# Patient Record
Sex: FEMALE | Race: WHITE | Employment: FULL TIME | URBAN - NONMETROPOLITAN AREA
[De-identification: names, ages, dates, MRNs, and addresses within clinical notes are randomized per-mention and may not be internally consistent; named-entity substitution may affect disease eponyms.]

---

## 2022-02-15 ENCOUNTER — HOSPITAL ENCOUNTER (OUTPATIENT)
Age: 57
Setting detail: OUTPATIENT SURGERY
Discharge: HOME | End: 2022-02-15
Attending: INTERNAL MEDICINE | Admitting: INTERNAL MEDICINE
Payer: COMMERCIAL

## 2022-02-15 VITALS
HEIGHT: 68 IN | SYSTOLIC BLOOD PRESSURE: 135 MMHG | HEART RATE: 90 BPM | DIASTOLIC BLOOD PRESSURE: 84 MMHG | RESPIRATION RATE: 16 BRPM | TEMPERATURE: 96.6 F | BODY MASS INDEX: 21.25 KG/M2 | WEIGHT: 140.2 LBS

## 2022-02-15 PROCEDURE — 3609015500 HC GASTRIC/DUODENAL MOTILITY &/OR MANOMETRY STUDY: Performed by: INTERNAL MEDICINE

## 2022-02-15 RX ORDER — AMITRIPTYLINE HYDROCHLORIDE 10 MG/1
TABLET, FILM COATED ORAL
COMMUNITY
Start: 2022-02-07

## 2022-02-15 RX ORDER — GABAPENTIN 300 MG/1
CAPSULE ORAL
COMMUNITY
Start: 2022-02-07

## 2022-02-15 RX ORDER — SUCRALFATE ORAL 1 G/10ML
1 SUSPENSION ORAL 4 TIMES DAILY
COMMUNITY

## 2022-02-15 RX ORDER — DULOXETIN HYDROCHLORIDE 60 MG/1
60 CAPSULE, DELAYED RELEASE ORAL DAILY
COMMUNITY
Start: 2021-12-21 | End: 2022-03-21

## 2022-02-15 RX ORDER — LEVOTHYROXINE AND LIOTHYRONINE 9.5; 2.25 UG/1; UG/1
15 TABLET ORAL DAILY
COMMUNITY
Start: 2021-11-10 | End: 2022-11-10

## 2022-02-15 RX ORDER — PANTOPRAZOLE SODIUM 40 MG/1
40 TABLET, DELAYED RELEASE ORAL DAILY
COMMUNITY
Start: 2021-09-08 | End: 2022-03-07

## 2022-02-15 RX ORDER — BUPROPION HYDROCHLORIDE 150 MG/1
TABLET ORAL
COMMUNITY
Start: 2021-11-23

## 2022-02-15 NOTE — PROGRESS NOTES
Patient in endo for Eft study. Consent form signed. Manometry probe passed into right nare at 50 cm into stomach  Liquid swallows performed  Discharge instructions given to pt and she verbalized understanding.  Patient tolerated well and d/c home

## 2022-02-25 NOTE — PROCEDURES
[address] [place]  [address]                High Resolution Esophageal Manometry Study      Patient:  Jerry Escobar    [id number] Gender: Female Physician: DR. Rafi Figueroa     / Age: 1965 : Jessica Phlegm    Height: 5 ft 8 in Referring Physician: Ken Jurado    Procedure: Esophageal Manometry with Impedance Examination Date: 02/15/2022     Lower Esophageal Sphincter Region  Normal Esophageal Motility  Normal   Landmarks   Number of swallows evaluated 11        LES midpoint (from nares)(cm) 43.0  High Resolution Parameters         Proximal LES (from nares)(cm) 42.0      Distal contractile integral(mean)(mmHg-cm-s) 48141.0 500-5000       Distal LES (from nares)(cm) 46.0      Distal contractile integral(highest)(mmHg-cm-s) 25580.4        LES length(cm) 4.0 2.7-4.8     Contractile front velocity(cm/s) 2.9 <9.0       Esophageal length (LES-UES centers)(cm) 25.4  Fairfield Classification         PIP (from nares)(cm) 43.5      Distal latency 6.8        Intraabdominal LES length(cm) 2.5      % failed (Fairfield Classification) 0        Hiatal hernia? No      % panesophageal pressurization 0    LES Pressures       % premature contraction 0        Pressure kevin. method eSleeve,IRP      % rapid contraction 0        Basal (respiratory min. )(mmHg) 111.9 4.8-32.0     % large breaks 0        Basal (respiratory mean)(mmHg) 142.4 13-43     % small breaks 0        Residual (mean)(mmHg) 48.8 <15.0 Impedance analysis            Incomplete bolus clearance(%) 0            Upper Esophageal Sphincter  Normal Pharyngeal / UES Motility  Normal   Location (center, fr. nares)(cm) 17.6  No. swallows evaluated 11    Mean basal pressure(mmHg) 101.0  Evaluated @ 3.0 & N/A above UES     Mean residual pressure(mmHg) 27.1 <12.0     Mean peak pressure(mmHg) 25.7           Fairfield Classification Findings*   EGJ: Outflow obstruction          Mean IRP (48.8 mmHg) is greater than 15 mmHg  Esophageal body: Some instances of intact or weak peristalsis  Finding: EGJ Outflow Obstruction  * Findings are based on published Girdler Classification scheme and are only intended to serve as a guide for patient diagnosis     Procedure   After confirmation of potential allergies, a topical analgesic was used to numb the nares followed by trans-nasal insertion of a High Resolution Manometry Catheter. Pressure bands of both UES and LES were observed on the color contour. Patient instructed to take deep breath to verify placement of catheter; diaphragmatic pinch noted on inspiration. Patient was assisted to supine position and catheter was stabilized. Patient encouraged to relax while acclimating to catheter for approximately 5 minutes. A 30 second baseline pressure was obtained to identify the UES and LES followed by a series of wet swallows using 5 ccs room temperature water to assess esophageal motility. At the conclusion of the procedure; the catheter was removed. Indications   CHEST PAIN /PRESSURE     Interpretation / Findings   UES Pressure and relaxation:  normal  Peristalsis: normal  Esophageal pressures:  normal  GEJ relaxation:  incomplete  Incomplete bolus clearance: <10%     Impressions   Incomplete relaxation of GEJ with intact or weak peristalsis consistent with GEJ Outflow obstruction due to mechanical obstruction or atypical Achalasia. EGD recommended if not performed. Does not meet criteria for Achalasia at this time.     Linda Rodriguez MD, MD  3:52 PM 2/25/2022

## 2022-03-17 ENCOUNTER — ANESTHESIA EVENT (OUTPATIENT)
Dept: ENDOSCOPY | Age: 57
End: 2022-03-17
Payer: COMMERCIAL

## 2022-03-17 ENCOUNTER — ANESTHESIA (OUTPATIENT)
Dept: ENDOSCOPY | Age: 57
End: 2022-03-17
Payer: COMMERCIAL

## 2022-03-17 ENCOUNTER — HOSPITAL ENCOUNTER (OUTPATIENT)
Age: 57
Setting detail: OUTPATIENT SURGERY
Discharge: HOME | End: 2022-03-17
Attending: INTERNAL MEDICINE | Admitting: INTERNAL MEDICINE
Payer: COMMERCIAL

## 2022-03-17 VITALS
SYSTOLIC BLOOD PRESSURE: 120 MMHG | DIASTOLIC BLOOD PRESSURE: 77 MMHG | RESPIRATION RATE: 16 BRPM | WEIGHT: 146.2 LBS | TEMPERATURE: 97.4 F | HEIGHT: 68 IN | BODY MASS INDEX: 22.16 KG/M2 | HEART RATE: 72 BPM | OXYGEN SATURATION: 98 %

## 2022-03-17 VITALS
RESPIRATION RATE: 10 BRPM | DIASTOLIC BLOOD PRESSURE: 87 MMHG | SYSTOLIC BLOOD PRESSURE: 133 MMHG | OXYGEN SATURATION: 100 %

## 2022-03-17 PROCEDURE — 2720000010 HC SURG SUPPLY STERILE: Performed by: INTERNAL MEDICINE

## 2022-03-17 PROCEDURE — 3609012400 HC EGD TRANSORAL BIOPSY SINGLE/MULTIPLE: Performed by: INTERNAL MEDICINE

## 2022-03-17 PROCEDURE — 3700000001 HC ADD 15 MINUTES (ANESTHESIA): Performed by: INTERNAL MEDICINE

## 2022-03-17 PROCEDURE — 2500000003 HC RX 250 WO HCPCS

## 2022-03-17 PROCEDURE — 88305 TISSUE EXAM BY PATHOLOGIST: CPT

## 2022-03-17 PROCEDURE — 6360000002 HC RX W HCPCS

## 2022-03-17 PROCEDURE — 3700000000 HC ANESTHESIA ATTENDED CARE: Performed by: INTERNAL MEDICINE

## 2022-03-17 PROCEDURE — 2580000003 HC RX 258: Performed by: INTERNAL MEDICINE

## 2022-03-17 PROCEDURE — 7100000010 HC PHASE II RECOVERY - FIRST 15 MIN: Performed by: INTERNAL MEDICINE

## 2022-03-17 PROCEDURE — 2580000003 HC RX 258

## 2022-03-17 PROCEDURE — 2709999900 HC NON-CHARGEABLE SUPPLY: Performed by: INTERNAL MEDICINE

## 2022-03-17 PROCEDURE — 3609012700 HC EGD DILATION SAVORY: Performed by: INTERNAL MEDICINE

## 2022-03-17 RX ORDER — BUSPIRONE HYDROCHLORIDE 15 MG/1
15 TABLET ORAL 2 TIMES DAILY
COMMUNITY

## 2022-03-17 RX ORDER — LIDOCAINE HYDROCHLORIDE 20 MG/ML
INJECTION, SOLUTION INFILTRATION; PERINEURAL PRN
Status: DISCONTINUED | OUTPATIENT
Start: 2022-03-17 | End: 2022-03-17 | Stop reason: SDUPTHER

## 2022-03-17 RX ORDER — SODIUM CHLORIDE 0.9 % (FLUSH) 0.9 %
5-40 SYRINGE (ML) INJECTION PRN
Status: DISCONTINUED | OUTPATIENT
Start: 2022-03-17 | End: 2022-03-17 | Stop reason: HOSPADM

## 2022-03-17 RX ORDER — PROGESTERONE 100 MG/1
150 CAPSULE ORAL DAILY
COMMUNITY

## 2022-03-17 RX ORDER — SODIUM CHLORIDE 9 MG/ML
INJECTION, SOLUTION INTRAVENOUS CONTINUOUS PRN
Status: DISCONTINUED | OUTPATIENT
Start: 2022-03-17 | End: 2022-03-17 | Stop reason: SDUPTHER

## 2022-03-17 RX ORDER — SODIUM CHLORIDE 0.9 % (FLUSH) 0.9 %
5-40 SYRINGE (ML) INJECTION EVERY 12 HOURS SCHEDULED
Status: DISCONTINUED | OUTPATIENT
Start: 2022-03-17 | End: 2022-03-17 | Stop reason: HOSPADM

## 2022-03-17 RX ORDER — PROPOFOL 10 MG/ML
INJECTION, EMULSION INTRAVENOUS PRN
Status: DISCONTINUED | OUTPATIENT
Start: 2022-03-17 | End: 2022-03-17 | Stop reason: SDUPTHER

## 2022-03-17 RX ORDER — SODIUM CHLORIDE 9 MG/ML
25 INJECTION, SOLUTION INTRAVENOUS PRN
Status: DISCONTINUED | OUTPATIENT
Start: 2022-03-17 | End: 2022-03-17 | Stop reason: HOSPADM

## 2022-03-17 RX ADMIN — PROPOFOL 40 MG: 10 INJECTION, EMULSION INTRAVENOUS at 09:22

## 2022-03-17 RX ADMIN — SODIUM CHLORIDE: 9 INJECTION, SOLUTION INTRAVENOUS at 09:08

## 2022-03-17 RX ADMIN — LIDOCAINE HYDROCHLORIDE 80 MG: 20 INJECTION, SOLUTION INFILTRATION; PERINEURAL at 09:19

## 2022-03-17 RX ADMIN — SODIUM CHLORIDE 25 ML: 9 INJECTION, SOLUTION INTRAVENOUS at 08:24

## 2022-03-17 RX ADMIN — PROPOFOL 50 MG: 10 INJECTION, EMULSION INTRAVENOUS at 09:20

## 2022-03-17 ASSESSMENT — PAIN SCALES - GENERAL
PAINLEVEL_OUTOF10: 0
PAINLEVEL_OUTOF10: 0

## 2022-03-17 ASSESSMENT — PAIN - FUNCTIONAL ASSESSMENT: PAIN_FUNCTIONAL_ASSESSMENT: 0-10

## 2022-03-17 NOTE — H&P
[facility]   Pre-Operative History and Physical: EGD    Patient: Veronica Castleman  : 1965     History Obtained From:  patient, electronic medical record    HISTORY OF PRESENT ILLNESS:    The patient is a 56 y.o. female who presents for an EGD for dysphagia, atypical chest pain, GERD      Past Medical History:    History reviewed. No pertinent past medical history. Past Surgical History:        Procedure Laterality Date     SECTION      CHOLECYSTECTOMY      ESOPHAGEAL MOTILITY STUDY N/A 2/15/2022    ESOPHAGEAL MOTILITY/MANOMETRY STUDY performed by Sesar Curiel MD at [facility]       Medications Prior to Admission:   No current facility-administered medications on file prior to encounter. Current Outpatient Medications on File Prior to Encounter   Medication Sig Dispense Refill    busPIRone (BUSPAR) 15 MG tablet Take 15 mg by mouth 2 times daily      progesterone (PROMETRIUM) 100 MG CAPS capsule Take 150 mg by mouth daily         Allergies:  Codeine and Sulfa antibiotics    Social History:   TOBACCO:   reports that she has never smoked. She has never used smokeless tobacco.  ETOH:   has no history on file for alcohol use. DRUGS:   has no history on file for drug use. Family History:   History reviewed. No pertinent family history. PHYSICAL EXAM:      BP (!) 146/87   Pulse 82   Temp 97.1 °F (36.2 °C) (Temporal)   Resp 16   Ht 5' 8\" (1.727 m)   Wt 146 lb 3.2 oz (66.3 kg)   SpO2 100%   BMI 22.23 kg/m²  I      Heart:  Regular rate and rhythm    Lungs:  No increased work of breathing    Abdomen:  BS+, soft, non-distended, non-tender, no masses palpated      ASA Grade:  See Anesthesia documentation    Mallampati Classification:  See Anesthesia documentation      ASSESSMENT AND PLAN:    1. Patient is a 56 y.o. female here for an EGD with MAC    2. Procedure options, risks and benefits reviewed with patient.   We specifically discussed that risks include but are not limited to infection, bleeding, perforation, death, and missed lesions. Patient expresses understanding.       Electronically signed by Lian Dent MD on 3/17/2022 at 8:35 AM    Lian Dent MD  [facility]

## 2022-03-17 NOTE — OP NOTE
scope was completely withdrawn. The patient tolerated the procedure well and was taken to the recovery area in good condition. There were no immediate complications. Estimated Blood Loss: minimal    Findings:    Esophagus: The GE junction was noted to be at 40cm. The z-line was irregular. Cold biopsies were taken and placed in Jar 2 to rule out Fine's Esophagus. The esophagus was empirically dilated using an American dilator over a guidewire to 57Fr. Stomach: There was mild diffuse bile acid gastritis. Cold biopsies were taken and placed in Jar 1. Duodenum: The first and 2nd portions of the duodenum appeared normal with normal villous pattern    Recommendations:   - Await pathology. - Continue current medications. - Follow up with primary care physician as scheduled. - Follow up with myself in 6-8 weeks unless previously scheduled.     Electronically signed by Kevan Ruff MD on 3/17/2022 at 9:35 AM    Kevan Ruff MD  [facility]

## 2022-03-17 NOTE — ANESTHESIA PRE PROCEDURE
Department of Anesthesiology  Preprocedure Note       Name:  Elma Puentes   Age:  56 y.o.  :  1965                                          MRN:  [id number]         Date:  3/17/2022      Surgeon: Manohar Marin):  Mirza Austin MD    Procedure: Procedure(s):  EGD DILATION SAVORY    Medications prior to admission:   Prior to Admission medications    Medication Sig Start Date End Date Taking?  Authorizing Provider   busPIRone (BUSPAR) 15 MG tablet Take 15 mg by mouth 2 times daily   Yes Historical Provider, MD   progesterone (PROMETRIUM) 100 MG CAPS capsule Take 150 mg by mouth daily   Yes Historical Provider, MD   pantoprazole (PROTONIX) 40 MG tablet Take 40 mg by mouth daily 9/8/21 3/7/22  Historical Provider, MD   sucralfate (CARAFATE) 1 GM/10ML suspension Take 1 g by mouth 4 times daily    Historical Provider, MD   thyroid (ARMOUR) 15 MG tablet Take 15 mg by mouth daily 11/10/21 11/10/22  Historical Provider, MD   buPROPion (WELLBUTRIN XL) 150 MG extended release tablet take 1 tablet by mouth once daily 21   Historical Provider, MD   gabapentin (NEURONTIN) 300 MG capsule take 1 capsule by mouth twice a day and 2 capsules at bedtime 22   Historical Provider, MD   DULoxetine (CYMBALTA) 60 MG extended release capsule Take 60 mg by mouth daily 12/21/21 3/21/22  Historical Provider, MD   amitriptyline (ELAVIL) 10 MG tablet take 4 tablets by mouth at bedtime 22   Historical Provider, MD   vitamin D 25 MCG (1000 UT) CAPS Take 4 Units by mouth daily    Historical Provider, MD       Current medications:    Current Facility-Administered Medications   Medication Dose Route Frequency Provider Last Rate Last Admin    sodium chloride flush 0.9 % injection 5-40 mL  5-40 mL IntraVENous 2 times per day Mirza Austin MD        sodium chloride flush 0.9 % injection 5-40 mL  5-40 mL IntraVENous PRN Mirza Austin MD        0.9 % sodium chloride infusion  25 mL IntraVENous PRN Mirza Austin  mL/hr at 22 0824 25 mL at 22 0824       Allergies: Allergies   Allergen Reactions    Codeine     Sulfa Antibiotics Hives       Problem List:  There is no problem list on file for this patient. Past Medical History:  History reviewed. No pertinent past medical history. Past Surgical History:        Procedure Laterality Date     SECTION      CHOLECYSTECTOMY      ESOPHAGEAL MOTILITY STUDY N/A 2/15/2022    ESOPHAGEAL MOTILITY/MANOMETRY STUDY performed by Linda Rodriguez MD at [facility]       Social History:    Social History     Tobacco Use    Smoking status: Never Smoker    Smokeless tobacco: Never Used   Substance Use Topics    Alcohol use: Not on file                                Counseling given: Not Answered      Vital Signs (Current):   Vitals:    22 0812   BP: (!) 146/87   Pulse: 82   Resp: 16   Temp: 36.2 °C (97.1 °F)   TempSrc: Temporal   SpO2: 100%   Weight: 146 lb 3.2 oz (66.3 kg)   Height: 5' 8\" (1.727 m)                                              BP Readings from Last 3 Encounters:   22 (!) 146/87   02/15/22 135/84       NPO Status: Time of last liquid consumption:                         Time of last solid consumption:                         Date of last liquid consumption: 22                        Date of last solid food consumption: 22    BMI:   Wt Readings from Last 3 Encounters:   22 146 lb 3.2 oz (66.3 kg)   02/15/22 140 lb 3.2 oz (63.6 kg)     Body mass index is 22.23 kg/m². CBC: No results found for: WBC, RBC, HGB, HCT, MCV, RDW, PLT    CMP: No results found for: NA, K, CL, CO2, BUN, CREATININE, GFRAA, AGRATIO, LABGLOM, GLUCOSE, GLU, PROT, CALCIUM, BILITOT, ALKPHOS, AST, ALT    POC Tests: No results for input(s): POCGLU, POCNA, POCK, POCCL, POCBUN, POCHEMO, POCHCT in the last 72 hours.     Coags: No results found for: PROTIME, INR, APTT    HCG (If Applicable): No results found for: PREGTESTUR, PREGSERUM, HCG, HCGQUANT ABGs: No results found for: PHART, PO2ART, RUX5IGC, WNR7YYS, BEART, H7GZLVBU     Type & Screen (If Applicable):  No results found for: LABABO, LABRH    Drug/Infectious Status (If Applicable):  No results found for: HIV, HEPCAB    COVID-19 Screening (If Applicable): No results found for: COVID19        Anesthesia Evaluation  Patient summary reviewed and Nursing notes reviewed  Airway: Mallampati: II  TM distance: >3 FB   Neck ROM: full  Mouth opening: > = 3 FB Dental:          Pulmonary: breath sounds clear to auscultation                             Cardiovascular:            Rhythm: regular                      Neuro/Psych:               GI/Hepatic/Renal:             Endo/Other:                     Abdominal:             Vascular: Other Findings:             Anesthesia Plan      MAC     ASA 1       Induction: intravenous. Anesthetic plan and risks discussed with patient.                       ANALY Friedman - CRNA   3/17/2022

## 2022-03-17 NOTE — PROGRESS NOTES
Pt in phase 2 drowsy but arouses easy. Dr Rahel Milian spoke to pt and  about findings. drinkin g fluids. Vitals stable.

## 2022-03-17 NOTE — ANESTHESIA POSTPROCEDURE EVALUATION
Department of Anesthesiology  Postprocedure Note    Patient: Cari Toledo  MRN: [id number]  YOB: 1965  Date of evaluation: 3/17/2022  Time:  9:39 AM     Procedure Summary     Date: 03/17/22 Room / Location: [address] [place] [address] / [address] [place] [address]    Anesthesia Start: 0850 Anesthesia Stop:     Procedures:       EGD DILATION SAVORY (N/A )      EGD BIOPSY Diagnosis: (DYSPHAGIA, ESOPHAGEAL DYSMOTILITY)    Surgeons: David Vazquez MD Responsible Provider: Scotty Bergeron DO    Anesthesia Type: MAC ASA Status: 1          Anesthesia Type: No value filed. Marina Phase I: Marina Score: 10    Marina Phase II:      Last vitals: Reviewed and per EMR flowsheets.        Anesthesia Post Evaluation    Patient location during evaluation: bedside  Patient participation: waiting for patient participation  Level of consciousness: sedated and ventilated and sleepy but conscious  Pain score: 0  Airway patency: patent  Nausea & Vomiting: no vomiting and no nausea  Complications: no  Cardiovascular status: hemodynamically stable  Respiratory status: acceptable  Hydration status: euvolemic

## 2022-07-26 ENCOUNTER — ANESTHESIA (OUTPATIENT)
Dept: ENDOSCOPY | Age: 57
End: 2022-07-26
Payer: COMMERCIAL

## 2022-07-26 ENCOUNTER — HOSPITAL ENCOUNTER (OUTPATIENT)
Age: 57
Setting detail: OUTPATIENT SURGERY
Discharge: HOME | End: 2022-07-26
Attending: INTERNAL MEDICINE | Admitting: INTERNAL MEDICINE
Payer: COMMERCIAL

## 2022-07-26 ENCOUNTER — ANESTHESIA EVENT (OUTPATIENT)
Dept: ENDOSCOPY | Age: 57
End: 2022-07-26
Payer: COMMERCIAL

## 2022-07-26 VITALS
RESPIRATION RATE: 18 BRPM | SYSTOLIC BLOOD PRESSURE: 143 MMHG | HEIGHT: 68 IN | HEART RATE: 84 BPM | WEIGHT: 139.8 LBS | OXYGEN SATURATION: 100 % | BODY MASS INDEX: 21.19 KG/M2 | DIASTOLIC BLOOD PRESSURE: 88 MMHG | TEMPERATURE: 97.9 F

## 2022-07-26 PROCEDURE — 3700000000 HC ANESTHESIA ATTENDED CARE: Performed by: INTERNAL MEDICINE

## 2022-07-26 PROCEDURE — 2580000003 HC RX 258: Performed by: INTERNAL MEDICINE

## 2022-07-26 PROCEDURE — 2709999900 HC NON-CHARGEABLE SUPPLY: Performed by: INTERNAL MEDICINE

## 2022-07-26 PROCEDURE — 3700000001 HC ADD 15 MINUTES (ANESTHESIA): Performed by: INTERNAL MEDICINE

## 2022-07-26 PROCEDURE — 7100000010 HC PHASE II RECOVERY - FIRST 15 MIN: Performed by: INTERNAL MEDICINE

## 2022-07-26 PROCEDURE — 7100000011 HC PHASE II RECOVERY - ADDTL 15 MIN: Performed by: INTERNAL MEDICINE

## 2022-07-26 PROCEDURE — 88305 TISSUE EXAM BY PATHOLOGIST: CPT

## 2022-07-26 PROCEDURE — 6360000002 HC RX W HCPCS: Performed by: REGISTERED NURSE

## 2022-07-26 PROCEDURE — 3609010600 HC COLONOSCOPY POLYPECTOMY SNARE/COLD BIOPSY: Performed by: INTERNAL MEDICINE

## 2022-07-26 RX ORDER — SODIUM CHLORIDE 0.9 % (FLUSH) 0.9 %
5-40 SYRINGE (ML) INJECTION EVERY 12 HOURS SCHEDULED
Status: DISCONTINUED | OUTPATIENT
Start: 2022-07-26 | End: 2022-07-26 | Stop reason: HOSPADM

## 2022-07-26 RX ORDER — SODIUM CHLORIDE 0.9 % (FLUSH) 0.9 %
5-40 SYRINGE (ML) INJECTION PRN
Status: DISCONTINUED | OUTPATIENT
Start: 2022-07-26 | End: 2022-07-26 | Stop reason: HOSPADM

## 2022-07-26 RX ORDER — SODIUM CHLORIDE 9 MG/ML
25 INJECTION, SOLUTION INTRAVENOUS PRN
Status: DISCONTINUED | OUTPATIENT
Start: 2022-07-26 | End: 2022-07-26 | Stop reason: HOSPADM

## 2022-07-26 RX ORDER — PROPOFOL 10 MG/ML
INJECTION, EMULSION INTRAVENOUS PRN
Status: DISCONTINUED | OUTPATIENT
Start: 2022-07-26 | End: 2022-07-26 | Stop reason: SDUPTHER

## 2022-07-26 RX ADMIN — PROPOFOL 400 MG: 10 INJECTION, EMULSION INTRAVENOUS at 15:08

## 2022-07-26 RX ADMIN — SODIUM CHLORIDE 25 ML: 9 INJECTION, SOLUTION INTRAVENOUS at 14:21

## 2022-07-26 ASSESSMENT — PAIN - FUNCTIONAL ASSESSMENT: PAIN_FUNCTIONAL_ASSESSMENT: NONE - DENIES PAIN

## 2022-07-26 NOTE — ANESTHESIA POSTPROCEDURE EVALUATION
Department of Anesthesiology  Postprocedure Note    Patient: Sarai Neal  MRN: [id number]  YOB: 1965  Date of evaluation: 7/26/2022      Procedure Summary     Date: 07/26/22 Room / Location: [address] [place] [address] / [address] [place] [address]    Anesthesia Start: 1448 Anesthesia Stop: 9560    Procedure: COLONOSCOPY POLYPECTOMY SNARE/COLD BIOPSY Diagnosis:       Constipation, unspecified constipation type      (constipation)    Surgeons: Shreya Pascual MD Responsible Provider: Dominique Dominguez DO    Anesthesia Type: MAC ASA Status: 2          Anesthesia Type: No value filed.     Marina Phase I: Marina Score: 10    Marina Phase II:        Anesthesia Post Evaluation    Patient location during evaluation: bedside  Patient participation: complete - patient participated  Level of consciousness: awake and alert  Airway patency: patent  Nausea & Vomiting: no nausea and no vomiting  Complications: no  Cardiovascular status: hemodynamically stable  Respiratory status: acceptable, spontaneous ventilation and nasal cannula  Hydration status: stable

## 2022-07-26 NOTE — H&P
[facility]   Pre-Operative History and Physical: Colonoscopy    Patient: Praveen Ford  : 1965      History Obtained From:  patient, electronic medical record    HISTORY OF PRESENT ILLNESS:    The patient is a 56 y.o. female who present for colonoscopy with constipation and a FH of CRC. Past Medical History:    No past medical history on file. Past Surgical History:        Procedure Laterality Date     SECTION      CHOLECYSTECTOMY      ESOPHAGEAL MOTILITY STUDY N/A 2/15/2022    ESOPHAGEAL MOTILITY/MANOMETRY STUDY performed by Soraida Gutierrez MD at [address] [place] [address] 3/17/2022    EGD DILATION SAVORY performed by Argenis Colindres MD at [address] [place] [address]  3/17/2022    EGD BIOPSY performed by Argenis Colindres MD at [facility]     Medications Prior to Admission:   No current facility-administered medications on file prior to encounter. Current Outpatient Medications on File Prior to Encounter   Medication Sig Dispense Refill    busPIRone (BUSPAR) 15 MG tablet Take 15 mg by mouth 2 times daily      progesterone (PROMETRIUM) 100 MG CAPS capsule Take 150 mg by mouth daily      pantoprazole (PROTONIX) 40 MG tablet Take 40 mg by mouth daily      sucralfate (CARAFATE) 1 GM/10ML suspension Take 1 g by mouth 4 times daily      thyroid (ARMOUR) 15 MG tablet Take 15 mg by mouth daily      buPROPion (WELLBUTRIN XL) 150 MG extended release tablet take 1 tablet by mouth once daily      gabapentin (NEURONTIN) 300 MG capsule take 1 capsule by mouth twice a day and 2 capsules at bedtime      DULoxetine (CYMBALTA) 60 MG extended release capsule Take 60 mg by mouth daily      amitriptyline (ELAVIL) 10 MG tablet take 4 tablets by mouth at bedtime      vitamin D 25 MCG (1000 UT) CAPS Take 4 Units by mouth daily          Allergies:  Codeine and Sulfa antibiotics    Social History:   TOBACCO:   reports that she has never smoked.  She has never used smokeless tobacco.  ETOH:   has no history on file for alcohol use. DRUGS:   has no history on file for drug use. Family History:   No family history on file. PHYSICAL EXAM:    There were no vitals taken for this visit. Heart:  Regular rate and rhythm  Lungs:  No increased work of breathing  Abdomen:  BS+, soft, non-distended, non-tender, no masses palpated    ASA Grade:  See Anesthesia documentation    Mallampati Classification:  See Anesthesia documentation    ASSESSMENT AND PLAN:    1. Patient is a 56 y.o. female here for a colonoscopy with MAC.    2.  Procedure options, risks and benefits reviewed with patient. We specifically discussed that risks include but are not limited to infection, bleeding, perforation, death, and missed lesions. Patient expresses understanding.     Electronically signed by Elvira Carrillo MD on 7/26/2022 at 1:57 PM    Elvira Carrillo MD  [facility]

## 2022-07-26 NOTE — DISCHARGE INSTRUCTIONS
- Await pathology. - Continue current medications.  - Repeat colonoscopy in 5 years pending pathology results  - Follow up with primary care physician as scheduled. - Follow up with myself in 6-8 weeks unless previously scheduled.

## 2022-07-26 NOTE — OP NOTE
[facility]  Colonoscopy Procedure Note      Patient: Solitario Dc  : 1965      Procedure: Colonoscopy with polypectomy (cold snare)    Date:  2022     Endoscopist:   Susy Carrillo MD    Referring Physician: Susy Carrillo MD  Primary Care Physician: Estephania Krishnan MD    Indications: This is a 56y.o. year old female who presents with constipation and a family history of CRC. Has a family history of [address] in her paternal grandmother at age 45s. Her dad and paternal aunt with colon polyps. Anesthesia: MAC per Anesthesia. Please see anesthesia report. Consent:  The patient or their legal guardian has signed a consent and is aware of the potential risks, benefits, alternatives, and potential complications of this procedure. These include, but are not limited to hemorrhage, bleeding, post procedural pain, perforation, phlebitis, aspiration, hypotension, hypoxia, cardiovascular events such as arrhythmia, and possibly death. Additionally, the possibility of missed colonic polyps and interval colon cancer was discussed in the consent. Description of Procedure: The patient was then taken to the endoscopy suite and placed in the left lateral decubitus position and the above IV sedation was administered. The perianal area was inspected and a digital rectal examination was performed. A forward-viewing Olympus video adult colonoscope was lubricated and inserted through the patient's anus into the rectum. Under direct visualization, the scope was advanced to the terminal ileum. The cecum was identified by the appendiceal orifice and ileocecal valve. [place] pictures were obtained. The prep was good. The scope was then slowly withdrawn and circumferential examination of the mucosa was performed. The scope was then withdrawn into the rectum and retroflexed. A retroflexed view of the anal verge and rectum was obtained.  The scope was straightened, the colon was decompressed and the scope was withdrawn from the patient. The patient tolerated the procedure well and was taken to the recovery area in good condition. There were no immediate complications. Estimated Blood Loss: minimal    Findings:    Terminal Ileum: Visualized and normal.    Colon: A 4-5 mm polyp in the hepatic flexure was removed by cold snare polypectomy and placed in Jar 1. Rectum with retroflexion: Grade I-II internal hemorrhoids    Recommendations:   - Await pathology. - Continue current medications.  - Repeat colonoscopy in 5 years pending pathology results  - Follow up with primary care physician as scheduled. - Follow up with myself in 6-8 weeks unless previously scheduled.     Electronically signed by Alexandra Luevano MD on 7/26/2022 at 3:38 PM    MD Yvette Scott

## 2022-07-26 NOTE — PROGRESS NOTES
. Colonoscopy. Tolerated well. Pictures taken. Hepatic flexure polypectomy via cold snare. Label per Dr Alisa Patel to lab for processing. 1 jar sent.

## 2022-07-26 NOTE — ANESTHESIA PRE PROCEDURE
Department of Anesthesiology  Preprocedure Note       Name:  Diana Mendiola   Age:  56 y.o.  :  1965                                          MRN:  [id number]         Date:  2022      Surgeon: Carlota Bedoya):  Risa Miller MD    Procedure: Procedure(s):  COLONOSCOPY    Medications prior to admission:   Prior to Admission medications    Medication Sig Start Date End Date Taking? Authorizing Provider   busPIRone (BUSPAR) 15 MG tablet Take 15 mg by mouth 2 times daily    Historical Provider, MD   progesterone (PROMETRIUM) 100 MG CAPS capsule Take 150 mg by mouth daily    Historical Provider, MD   pantoprazole (PROTONIX) 40 MG tablet Take 40 mg by mouth daily 9/8/21 3/7/22  Historical Provider, MD   sucralfate (CARAFATE) 1 GM/10ML suspension Take 1 g by mouth 4 times daily    Historical Provider, MD   thyroid (ARMOUR) 15 MG tablet Take 15 mg by mouth daily 11/10/21 11/10/22  Historical Provider, MD   buPROPion (WELLBUTRIN XL) 150 MG extended release tablet take 1 tablet by mouth once daily 21   Historical Provider, MD   gabapentin (NEURONTIN) 300 MG capsule take 1 capsule by mouth twice a day and 2 capsules at bedtime 22   Historical Provider, MD   DULoxetine (CYMBALTA) 60 MG extended release capsule Take 60 mg by mouth daily 12/21/21 3/21/22  Historical Provider, MD   amitriptyline (ELAVIL) 10 MG tablet take 4 tablets by mouth at bedtime 22   Historical Provider, MD   vitamin D 25 MCG (1000 UT) CAPS Take 4 Units by mouth daily    Historical Provider, MD       Current medications:    No current facility-administered medications for this encounter. Allergies: Allergies   Allergen Reactions    Codeine     Sulfa Antibiotics Hives       Problem List:  There is no problem list on file for this patient. Past Medical History:  No past medical history on file.     Past Surgical History:        Procedure Laterality Date     SECTION      CHOLECYSTECTOMY      ESOPHAGEAL MOTILITY STUDY N/A 2/15/2022    ESOPHAGEAL MOTILITY/MANOMETRY STUDY performed by Arlene Barksdale MD at [address] [facility] N/A 3/17/2022    EGD DILATION SAVORY performed by Cleopatra Milian MD at [facility]  3/17/2022    EGD BIOPSY performed by Cleopatra Milian MD at [address] [facility]       Social History:    Social History     Tobacco Use    Smoking status: Never    Smokeless tobacco: Never   Substance Use Topics    Alcohol use: Not on file                                Counseling given: Not Answered      Vital Signs (Current): There were no vitals filed for this visit. BP Readings from Last 3 Encounters:   03/17/22 120/77   03/17/22 133/87   02/15/22 135/84       NPO Status:                                                                                 BMI:   Wt Readings from Last 3 Encounters:   03/17/22 146 lb 3.2 oz (66.3 kg)   02/15/22 140 lb 3.2 oz (63.6 kg)     There is no height or weight on file to calculate BMI.    CBC: No results found for: WBC, RBC, HGB, HCT, MCV, RDW, PLT    CMP: No results found for: NA, K, CL, CO2, BUN, CREATININE, GFRAA, AGRATIO, LABGLOM, GLUCOSE, GLU, PROT, CALCIUM, BILITOT, ALKPHOS, AST, ALT    POC Tests: No results for input(s): POCGLU, POCNA, POCK, POCCL, POCBUN, POCHEMO, POCHCT in the last 72 hours.     Coags: No results found for: PROTIME, INR, APTT    HCG (If Applicable): No results found for: PREGTESTUR, PREGSERUM, HCG, HCGQUANT     ABGs: No results found for: PHART, PO2ART, RJT0WKC, MHH6UHB, BEART, W2RFXSOS     Type & Screen (If Applicable):  No results found for: LABABO, LABRH    Drug/Infectious Status (If Applicable):  No results found for: HIV, HEPCAB    COVID-19 Screening (If Applicable): No results found for: COVID19        Anesthesia Evaluation  Patient summary reviewed no history of anesthetic complications:   Airway: Mallampati: II          Dental: normal exam Pulmonary:Negative Pulmonary ROS and normal exam                               Cardiovascular:Negative CV ROS          ECG reviewed                        Neuro/Psych:   Negative Neuro/Psych ROS  (+) depression/anxiety             GI/Hepatic/Renal:   (+) GERD:,           Endo/Other:    (+) hypothyroidism::., .                 Abdominal:             Vascular: negative vascular ROS. Other Findings:           Anesthesia Plan      MAC     ASA 2       Induction: intravenous. Anesthetic plan and risks discussed with patient and spouse. Plan discussed with CRNA.     Attending anesthesiologist reviewed and agrees with [facility] ANALY Real - CRNA   7/26/2022

## 2023-01-25 ENCOUNTER — OFFICE VISIT (OUTPATIENT)
Dept: DERMATOLOGY | Age: 58
End: 2023-01-25
Payer: COMMERCIAL

## 2023-01-25 VITALS
DIASTOLIC BLOOD PRESSURE: 94 MMHG | SYSTOLIC BLOOD PRESSURE: 133 MMHG | BODY MASS INDEX: 22.62 KG/M2 | HEART RATE: 88 BPM | OXYGEN SATURATION: 100 % | WEIGHT: 148.8 LBS | TEMPERATURE: 98.3 F

## 2023-01-25 DIAGNOSIS — L72.9 CYST OF SKIN: ICD-10-CM

## 2023-01-25 DIAGNOSIS — L30.4 TOE WEB INTERTRIGO: ICD-10-CM

## 2023-01-25 DIAGNOSIS — D22.9 MULTIPLE NEVI: ICD-10-CM

## 2023-01-25 DIAGNOSIS — L81.4 SOLAR LENTIGO: Primary | ICD-10-CM

## 2023-01-25 DIAGNOSIS — L57.8 ACTINIC SKIN DAMAGE: ICD-10-CM

## 2023-01-25 DIAGNOSIS — L73.8 SEBACEOUS HYPERPLASIA OF FACE: ICD-10-CM

## 2023-01-25 PROCEDURE — 99203 OFFICE O/P NEW LOW 30 MIN: CPT | Performed by: DERMATOLOGY

## 2023-01-25 RX ORDER — PROGESTERONE 200 MG/1
200 CAPSULE ORAL DAILY
COMMUNITY

## 2023-01-25 RX ORDER — MAGNESIUM 200 MG
200 TABLET ORAL DAILY
COMMUNITY

## 2023-01-25 RX ORDER — ESOMEPRAZOLE MAGNESIUM 40 MG/1
CAPSULE, DELAYED RELEASE ORAL
COMMUNITY
Start: 2022-11-18

## 2023-01-25 RX ORDER — UBIDECARENONE 200 MG
200 CAPSULE ORAL DAILY
COMMUNITY

## 2023-01-25 RX ORDER — BENZONATATE 200 MG/1
CAPSULE ORAL
COMMUNITY
Start: 2022-11-09

## 2023-01-25 RX ORDER — DIMENHYDRINATE 50 MG
1 TABLET ORAL DAILY
COMMUNITY

## 2023-01-25 RX ORDER — CHOLESTYRAMINE LIGHT 4 G/5.7G
4 POWDER, FOR SUSPENSION ORAL
COMMUNITY

## 2023-01-25 RX ORDER — PREDNISONE 10 MG/1
TABLET ORAL
COMMUNITY
Start: 2022-12-28

## 2023-01-25 RX ORDER — METOCLOPRAMIDE 5 MG/1
TABLET ORAL
COMMUNITY
Start: 2022-12-31

## 2023-01-25 RX ORDER — CALCIUM CITRATE 1040MG
2 TABLET ORAL DAILY
COMMUNITY

## 2023-01-25 RX ORDER — AZITHROMYCIN 250 MG/1
TABLET, FILM COATED ORAL
COMMUNITY
Start: 2022-12-28

## 2023-01-25 RX ORDER — RABEPRAZOLE SODIUM 20 MG/1
20 TABLET, DELAYED RELEASE ORAL 2 TIMES DAILY
COMMUNITY

## 2023-01-25 RX ORDER — ALPHA LIPOIC ACID 300 MG
CAPSULE ORAL
COMMUNITY

## 2023-01-25 RX ORDER — AMITRIPTYLINE HYDROCHLORIDE 10 MG/1
40 TABLET, FILM COATED ORAL NIGHTLY
COMMUNITY

## 2023-01-25 NOTE — PROGRESS NOTES
Dermatology Patient Note  [address] [facility]  [address] [facility] [address] [address]  Dept: [phone]  Dept Fax: [phone]      VISITDATE: 1/25/2023   REFERRING PROVIDER: No ref. provider found      Kip Mcgowan is a 56 y.o. female  who presents today in the office for:    Establish Care and Mole (Face, /pt states she has a hole on forehead/Right neck, pt states mole is getting darker)      HISTORY OF PRESENT ILLNESS:  New patient for evaluation of nevi. Patient has a brown lesion on the right neck she would like examined. She also has a recurrent cyst on her forehead. MEDICAL PROBLEMS:  There are no problems to display for this patient. CURRENT MEDICATIONS:   Current Outpatient Medications   Medication Sig Dispense Refill    Alpha-Lipoic Acid 300 MG CAPS Take by mouth      amitriptyline (ELAVIL) 10 MG tablet Take 40 mg by mouth nightly      THYROID PO Take 7.5 mg by mouth daily      coenzyme Q10 200 MG CAPS capsule Take 200 mg by mouth daily      esomeprazole (NEXIUM) 40 MG delayed release capsule take 1 capsule by mouth daily      magnesium 200 MG TABS tablet Take 200 mg by mouth daily      metoclopramide (REGLAN) 5 MG tablet take 1 tablet by mouth twice a day      B Complex Vitamins (B COMPLEX 50 PO) Take 1 capsule by mouth daily      Benfotiamine 150 MG CAPS Take 150 mg by mouth 2 times daily      NONFORMULARY BI-EST SR  0.3125 MG . Take 1-2 capsules once or twice daily as directed.       NONFORMULARY Apply 22.5 mg topically daily      PROBIOTIC PRODUCT PO Take by mouth daily      NONFORMULARY daily      busPIRone (BUSPAR) 15 MG tablet Take 15 mg by mouth 2 times daily      progesterone (PROMETRIUM) 100 MG CAPS capsule Take 150 mg by mouth daily      gabapentin (NEURONTIN) 300 MG capsule take 1 capsule by mouth twice a day and 2 capsules at bedtime      vitamin D 25 MCG (1000 UT) CAPS Take 4 Units by mouth daily azithromycin (ZITHROMAX) 250 MG tablet take 2 tablets by mouth on day 1 then take 1 tablet on days 2 through 5 (Patient not taking: Reported on 1/25/2023)      benzonatate (TESSALON) 200 MG capsule take 1 capsule by mouth three times a day if needed for cough (Patient not taking: Reported on 1/25/2023)      Calcium Citrate 1040 MG TABS Take 2 tablets by mouth daily (Patient not taking: Reported on 1/25/2023)      Cetirizine HCl 10 MG CAPS Take by mouth daily (Patient not taking: Reported on 1/25/2023)      cholestyramine light (PREVALITE) 4 GM/DOSE powder Take 4 g by mouth (Patient not taking: Reported on 1/25/2023)      estrogens conjugated (PREMARIN) 0.625 MG/GM CREA vaginal cream Place 1 g vaginally daily (Patient not taking: Reported on 1/25/2023)      Fluticasone Propionate, Inhal, 50 MCG/ACT AEPB Inhale 1 puff into the lungs (Patient not taking: Reported on 1/25/2023)      predniSONE (DELTASONE) 10 MG tablet take 4 tablets once daily for 3 days then 3 for 3 days then 2 for. ..  (REFER TO PRESCRIPTION NOTES).  (Patient not taking: Reported on 1/25/2023)      progesterone (PROMETRIUM) 200 MG CAPS capsule Take 200 mg by mouth daily (Patient not taking: Reported on 1/25/2023)      RABEprazole (ACIPHEX) 20 MG tablet Take 20 mg by mouth 2 times daily (Patient not taking: Reported on 1/25/2023)      vitamin A 3 MG (36212 UT) capsule Take 10,000 Units by mouth daily (Patient not taking: Reported on 1/25/2023)      Alpha-Lipoic Acid 50 MG CAPS Take 1 capsule by mouth daily (Patient not taking: Reported on 1/25/2023)      ALPHA LIPOIC ACID PO Take 1 capsule by mouth daily (Patient not taking: Reported on 1/25/2023)      magnesium 200 MG TABS tablet Take 200 mg by mouth daily (Patient not taking: Reported on 1/25/2023)      MAGNESIUM CITRATE PO Take 300 mg by mouth as needed (Patient not taking: Reported on 1/25/2023)      pantoprazole (PROTONIX) 40 MG tablet Take 40 mg by mouth daily      sucralfate (CARAFATE) 1 GM/10ML suspension Take 1 g by mouth 4 times daily (Patient not taking: Reported on 1/25/2023)      thyroid (ARMOUR) 15 MG tablet Take 15 mg by mouth daily      buPROPion (WELLBUTRIN XL) 150 MG extended release tablet take 1 tablet by mouth once daily (Patient not taking: Reported on 1/25/2023)      DULoxetine (CYMBALTA) 60 MG extended release capsule Take 60 mg by mouth daily      amitriptyline (ELAVIL) 10 MG tablet take 4 tablets by mouth at bedtime (Patient not taking: Reported on 1/25/2023)       No current facility-administered medications for this visit. ALLERGIES:   Allergies   Allergen Reactions    Bupropion Nausea Only and Other (See Comments)     Lethargy and vivid dreams    Codeine     Diphenhydramine Other (See Comments)     Other reaction(s): Mental Status Change  Nervous feeling. (IV only)  (IV ONLY) anxiety  Other reaction(s): Mental Status Change, Other (See Comments)  Nervous feeling. (IV only)  (IV ONLY) anxiety      Nitrofurantoin      Other reaction(s): GI Upset    Nitrofurantoin Monohyd Macro      Stomach cramps    Ondansetron Nausea Only     Other reaction(s): GI Upset  Other reaction(s): GI Upset      Promethazine Other (See Comments)     Other reaction(s): Mental Status Change  Nervous feeling  (IV ONLY) causes anxiety  Other reaction(s): Mental Status Change, Other (See Comments)  Nervous feeling  (IV ONLY) causes anxiety      Sulfa Antibiotics Hives       SOCIAL HISTORY:  Social History     Tobacco Use    Smoking status: Never    Smokeless tobacco: Never   Substance Use Topics    Alcohol use: Not Currently       Pertinent ROS:  Review of Systems  Skin: Denies any new changing, growing or bleeding lesions or rashes except as described in the HPI   Constitutional: Denies fevers, chills, and malaise.     PHYSICAL EXAM:   BP (!) 133/94   Pulse 88   Temp 98.3 °F (36.8 °C) (Temporal)   Wt 148 lb 12.8 oz (67.5 kg)   SpO2 100%   BMI 22.62 kg/m²     The patient is generally well appearing, well nourished, alert and conversational. Affect is normal.    Cutaneous Exam:  Physical Exam  Total body skin exam excluding external genitalia: head/face, neck, both arms, chest, back, abdomen, both legs, buttocks, digits and/or nails, was examined. Genital exam was deferred as patient denied having any lesions in this area. Complete visualization of scalp may be limited by hair density, length, and/or style    Facial covering was removed during examination. Diagnoses/exam findings/medical history pertinent to this visit are listed below:    Assessment:   Diagnosis Orders   1. Solar lentigo        2. Cyst of skin        3. Sebaceous hyperplasia of face        4. Multiple nevi        5. Actinic skin damage        6. Tinea pedis of right foot             Plan:  Solar lentigo of right jawline  - patient was counseled that UV-damaged skin increases lifetime risk for skin cancer  - I recommended the patient apply broad spectrum spf 30+ sunscreen daily, reapplying every 2 hours. - In additional to regular use of sunscreen, I recommended broad-rimmed hats, long sleeves, and seeking the shade.  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 - Common nevi have a low individual risk of developing into melanoma. Patients with >50 nevi have a greater risk of developing melanoma in their lifetime and should undergo skin checks at least annually. - I recommended the patient apply broad spectrum spf 30+ sunscreen daily, reapplying every 2 hours  - In additional to regular use of sunscreen, I recommended broad-rimmed hats, long sleeves, and seeking the shade.      Actinic skin damage  - patient was counseled that UV-damaged skin increases lifetime risk for skin cancer  - I recommended the patient apply broad spectrum spf 30+ sunscreen daily, reapplying every 2 hours. - In additional to regular use of sunscreen, I recommended broad-rimmed hats, long sleeves, and seeking the shade. Intertrigo of toeweb  - discussed diagnosis, etiology, natural course, and treatment options  - ciclopirox cream BID     RTC 1 year     No future appointments. Patient Instructions   Terressa Paganini Protection     There are two types of sun rays that are harmful to the skin. UVA rays cause skin aging and skin cancer, such as melanoma. UVB rays cause sunburns, cataracts, and also contribute to skin cancer. The American-Academy of Dermatology recommends that children and adults wear a broad spectrum, waterproof sunscreen with a Sun Protection Factor (SPF) of 30 or higher. It is important to check the ingredient label to be sure the sunscreen will protect the skin from both UVA and UVB sunrays. Your sunscreen should contain at least one of the following ingredients: titanium dioxide, zinc oxide, or avobenzone. Sunscreen will not be effective unless it is applied to all exposed skin. Sunscreens work best if they are applied 30 minutes before sun exposure. They should be reapplied every 2 hours and after any water exposure. Sunscreen is not perfect. It is important to use other methods to protect the skin from sun exposure also. Wear hats, sunglasses and other sun protective clothing when outdoors. Stay in the shade during the peak hours of sun exposure between 10 AM and 4 PM.    Moles    Moles, or nevi, are very common. Moles are areas of the skin where there are more cells called melanocytes. Melanocytes are the cells in the body that produce pigment, or color. Moles can be many colors including skin-tone, pink, tan, brown, and very dark brown to black. Moles can be raised or flat. Moles can have hair.  Moles can grow on any skin surface, including the scalp, hands and feet. When someone is born with a mole, or develops one in the first months of life, the mole is called a congenital, or birthmark mole. About 1 in 100 people are born with one or more moles. Most people develop their moles later in childhood or adulthood. These are called acquired moles. They are most common on sun exposed areas of skin such as the face, neck, upper body, arms and legs. CHECKING MOLES  Most moles are harmless, but in rare cases moles may become cancerous. Checking moles and looking for changes is an important step in helping to catch worrisome changes early. Some changes to look for are asymmetry (moles that do not look the same on each half), irregular shapes or borders, uneven color or large size. Also look for any moles that bleed, itch, or become painful. Looking at your skin regularly can help you recognize moles that are more at risk for becoming cancerous. WHEN TO CALL THE DOCTOR  Call your doctor if you see any of the following changes in a mole:       Irregular borders (uneven shape or edges)       Changes in color to black, blue or red. Changes in the surface texture       Scabs, scaling, irritation or bleeding in the mole    TREATMENT FOR MOLES  Often we can simply look at your moles and tell you if they look worrisome. If we are not concerned about the look of your moles at your appointment, we may measure some moles and take some photos that will allow us to watch for future changes in the moles. TREATMENT FOR MOLES  If a mole is getting irritated frequently, bleeding, difficult to watch due to location or dark color, atypical in appearance, or worrisome, we may perform a skin biopsy. A skin biopsy is a procedure that involves removing the mole so that it can be looked at under a microscope. There are many methods used to remove moles.  The method we choose depends on the location of the mole, the size of the mole, and the amount of concern for skin cancer. Generally, removing moles in the dermatologists office is a simple and safe procedure that can be done with local anesthesia. PREVENTION  You can do some things to prevent moles from becoming cancerous:       Try to avoid long periods of time in the sun and severe sunburns. The sun is        especially dangerous between 10:00 am and 4:00 pm.       Use a broad spectrum, water-resistant sun block lotion with an SPF of 30 or        greater. A broad spectrum lotion blocks both UVA and UVB rays from the sun. Re-apply sunscreen at least every 2 hours and after swimming or sweating. Take advantage of shade whenever possible. Wear a broad-brimmed hat,        sunglasses, and protective clothing when outdoors. Do not use tanning beds. Start tretinoin cream nightly on the face as prescribed. Use ciclopirox cream twice daily to toes. Lauren Dorado, personally scribed the services dictated to me by Dr. Andrew Dudley in this documentation. I, Dr. Andrew Dudley, personally performed the services described in this documentation, as scribed by [facility] in my presence, and it is both accurate and complete.     Electronically signed by Raf Vargas MD on 1/25/2023 at 10:57 AM

## 2023-01-25 NOTE — PATIENT INSTRUCTIONS
Sun Protection     There are two types of sun rays that are harmful to the skin. UVA rays cause skin aging and skin cancer, such as melanoma. UVB rays cause sunburns, cataracts, and also contribute to skin cancer. The American-Academy of Dermatology recommends that children and adults wear a broad spectrum, waterproof sunscreen with a Sun Protection Factor (SPF) of 30 or higher. It is important to check the ingredient label to be sure the sunscreen will protect the skin from both UVA and UVB sunrays. Your sunscreen should contain at least one of the following ingredients: titanium dioxide, zinc oxide, or avobenzone. Sunscreen will not be effective unless it is applied to all exposed skin. Sunscreens work best if they are applied 30 minutes before sun exposure. They should be reapplied every 2 hours and after any water exposure. Sunscreen is not perfect. It is important to use other methods to protect the skin from sun exposure also. Wear hats, sunglasses and other sun protective clothing when outdoors. Stay in the shade during the peak hours of sun exposure between 10 AM and 4 PM.    Moles    Moles, or nevi, are very common. Moles are areas of the skin where there are more cells called melanocytes. Melanocytes are the cells in the body that produce pigment, or color. Moles can be many colors including skin-tone, pink, tan, brown, and very dark brown to black. Moles can be raised or flat. Moles can have hair. Moles can grow on any skin surface, including the scalp, hands and feet. When someone is born with a mole, or develops one in the first months of life, the mole is called a congenital, or birthmark mole. About 1 in 100 people are born with one or more moles. Most people develop their moles later in childhood or adulthood. These are called acquired moles. They are most common on sun exposed areas of skin such as the face, neck, upper body, arms and legs.     CHECKING MOLES  Most moles are harmless, but in rare cases moles may become cancerous. Checking moles and looking for changes is an important step in helping to catch worrisome changes early. Some changes to look for are asymmetry (moles that do not look the same on each half), irregular shapes or borders, uneven color or large size. Also look for any moles that bleed, itch, or become painful. Looking at your skin regularly can help you recognize moles that are more at risk for becoming cancerous. WHEN TO CALL THE DOCTOR  Call your doctor if you see any of the following changes in a mole:       Irregular borders (uneven shape or edges)       Changes in color to black, blue or red. Changes in the surface texture       Scabs, scaling, irritation or bleeding in the mole    TREATMENT FOR MOLES  Often we can simply look at your moles and tell you if they look worrisome. If we are not concerned about the look of your moles at your appointment, we may measure some moles and take some photos that will allow us to watch for future changes in the moles. TREATMENT FOR MOLES  If a mole is getting irritated frequently, bleeding, difficult to watch due to location or dark color, atypical in appearance, or worrisome, we may perform a skin biopsy. A skin biopsy is a procedure that involves removing the mole so that it can be looked at under a microscope. There are many methods used to remove moles. The method we choose depends on the location of the mole, the size of the mole, and the amount of concern for skin cancer. Generally, removing moles in the dermatologists office is a simple and safe procedure that can be done with local anesthesia. PREVENTION  You can do some things to prevent moles from becoming cancerous:       Try to avoid long periods of time in the sun and severe sunburns.  The sun is        especially dangerous between 10:00 am and 4:00 pm.       Use a broad spectrum, water-resistant sun block lotion with an SPF of 30 or greater. A broad spectrum lotion blocks both UVA and UVB rays from the sun. Re-apply sunscreen at least every 2 hours and after swimming or sweating. Take advantage of shade whenever possible. Wear a broad-brimmed hat,        sunglasses, and protective clothing when outdoors. Do not use tanning beds. Start tretinoin cream nightly on the face as prescribed. Use ciclopirox cream twice daily to toes.

## 2024-02-26 ENCOUNTER — OFFICE VISIT (OUTPATIENT)
Dept: DERMATOLOGY | Age: 59
End: 2024-02-26
Payer: COMMERCIAL

## 2024-02-26 VITALS
SYSTOLIC BLOOD PRESSURE: 136 MMHG | WEIGHT: 150.8 LBS | DIASTOLIC BLOOD PRESSURE: 86 MMHG | HEART RATE: 85 BPM | RESPIRATION RATE: 16 BRPM | OXYGEN SATURATION: 99 % | BODY MASS INDEX: 22.85 KG/M2 | HEIGHT: 68 IN

## 2024-02-26 DIAGNOSIS — D22.9 MULTIPLE NEVI: ICD-10-CM

## 2024-02-26 DIAGNOSIS — L72.11 PILAR CYST OF SCALP: ICD-10-CM

## 2024-02-26 DIAGNOSIS — L72.9 CYST OF SKIN: ICD-10-CM

## 2024-02-26 DIAGNOSIS — L57.8 ACTINIC SKIN DAMAGE: Primary | ICD-10-CM

## 2024-02-26 DIAGNOSIS — L81.4 SOLAR LENTIGO: ICD-10-CM

## 2024-02-26 DIAGNOSIS — L73.8 SEBACEOUS HYPERPLASIA OF FACE: ICD-10-CM

## 2024-02-26 PROCEDURE — 99213 OFFICE O/P EST LOW 20 MIN: CPT | Performed by: DERMATOLOGY

## 2024-02-26 NOTE — PATIENT INSTRUCTIONS
- start tretinoin cream to face nightly; use goodrx for best pricing     Sun Protection     There are two types of sun rays that are harmful to the skin.  UVA rays cause skin aging and skin cancer, such as melanoma.  UVB rays cause sunburns, cataracts, and also contribute to skin cancer.    The American-Academy of Dermatology recommends that children and adults wear a broad spectrum, waterproof sunscreen with a Sun Protection Factor (SPF) of 30 or higher.  It is important to check the ingredient label to be sure the sunscreen will protect the skin from both UVA and UVB sunrays.  Your sunscreen should contain at least one of the following ingredients: titanium dioxide, zinc oxide, or avobenzone.    Sunscreen will not be effective unless it is applied to all exposed skin.  Sunscreens work best if they are applied 30 minutes before sun exposure.  They should be reapplied every 2 hours and after any water exposure.    Sunscreen is not perfect.  It is important to use other methods to protect the skin from sun exposure also.  Wear hats, sunglasses and other sun protective clothing when outdoors.  Stay in the shade during the peak hours of sun exposure between 10 AM and 4 PM. , Moles    Moles, or nevi, are very common. Moles are areas of the skin where there are more cells called melanocytes. Melanocytes are the cells in the body that produce pigment, or color. Moles can be many colors including skin-tone, pink, tan, brown, and very dark brown to black. Moles can be raised or flat. Moles can have hair. Moles can grow on any skin surface, including the scalp, hands and feet. When someone is born with a mole, or develops one in the first months of life, the mole is called a congenital, or birthmark mole. About 1 in 100 people are born with one or more moles. Most people develop their moles later in childhood or adulthood. These are called acquired moles. They are most common on sun exposed areas of skin such as the face,

## 2024-02-26 NOTE — PROGRESS NOTES
techniques are available but usually not effective.  When to Seek Medical Care  If a spot on the skin is growing, bleeding, painful, or itchy, or any other concerning changes, then see your doctor.         I, Ines Blum, personally scribed the services dictated to me by Dr. Tinajero in this documentation.     I, Dr. Tinajero, personally performed the services described in this documentation, as scribed by Ines Blum in my presence, and it is both accurate and complete.

## 2025-02-24 ENCOUNTER — OFFICE VISIT (OUTPATIENT)
Dept: DERMATOLOGY | Age: 60
End: 2025-02-24
Payer: COMMERCIAL

## 2025-02-24 VITALS
SYSTOLIC BLOOD PRESSURE: 120 MMHG | WEIGHT: 146.5 LBS | HEART RATE: 84 BPM | OXYGEN SATURATION: 98 % | DIASTOLIC BLOOD PRESSURE: 76 MMHG | HEIGHT: 68 IN | BODY MASS INDEX: 22.2 KG/M2

## 2025-02-24 DIAGNOSIS — D22.9 MULTIPLE NEVI: ICD-10-CM

## 2025-02-24 DIAGNOSIS — D22.9 IRRITATED NEVUS: ICD-10-CM

## 2025-02-24 DIAGNOSIS — L81.4 SOLAR LENTIGO: ICD-10-CM

## 2025-02-24 DIAGNOSIS — L72.11 PILAR CYST OF SCALP: ICD-10-CM

## 2025-02-24 DIAGNOSIS — L57.8 ACTINIC SKIN DAMAGE: Primary | ICD-10-CM

## 2025-02-24 DIAGNOSIS — L73.8 SEBACEOUS HYPERPLASIA OF FACE: ICD-10-CM

## 2025-02-24 DIAGNOSIS — D23.5 DILATED PORE OF WINER OF BACK: ICD-10-CM

## 2025-02-24 PROCEDURE — 99213 OFFICE O/P EST LOW 20 MIN: CPT | Performed by: DERMATOLOGY

## 2025-02-24 RX ORDER — TRETINOIN 0.25 MG/G
CREAM TOPICAL
Qty: 45 G | Refills: 3 | Status: SHIPPED | OUTPATIENT
Start: 2025-02-24

## 2025-02-24 RX ORDER — ONDANSETRON 4 MG/1
TABLET, ORALLY DISINTEGRATING ORAL
COMMUNITY
Start: 2025-01-09

## 2025-02-24 RX ORDER — MODAFINIL 200 MG/1
200 TABLET ORAL DAILY
COMMUNITY
Start: 2025-01-30 | End: 2025-02-24

## 2025-02-24 NOTE — PROGRESS NOTES
Dermatology Patient Note  [facility], [facility]  [facility] DERM [facility] SKIN A DEPARTMENT OF [facility]  [address] [facility] [address]  Dept: [phone]  Dept Fax: [phone]      VISITDATE: 2/24/2025   REFERRING PROVIDER: No ref. provider found      Carmela Brunson is a 59 y.o. female  who presents today in the office for:    Annual Exam (Yearly skin check. Concerned with spot on head)      HISTORY OF PRESENT ILLNESS:  Patient presents for 1 year [facility]. 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       Today, patient reports concern for a spot on the head that is growing in size and becoming more tender.     Patient would like the mole on her right shoulder removed as it often catches onto her clothing articles.     MEDICAL PROBLEMS:  There are no problems to display for this patient.      CURRENT MEDICATIONS:   Current Outpatient Medications   Medication Sig Dispense Refill    ondansetron (ZOFRAN-ODT) 4 MG disintegrating tablet Dissolve 2 tablets (8 mg total) in mouth every 8 (eight) hours as needed for Nausea.      tretinoin (RETIN-A) 0.025 % cream Apply pea sized amount to face nightly 45 g 3    Alpha-Lipoic Acid 300 MG CAPS Take by mouth      amitriptyline (ELAVIL) 10 MG tablet Take 4 tablets by mouth nightly      Calcium Citrate 1040 MG TABS Take 2 tablets by mouth daily      coenzyme Q10 200 MG CAPS capsule Take 1 capsule by mouth daily      esomeprazole (NEXIUM) 40 MG delayed release capsule take 1 capsule by mouth daily      magnesium 200 MG TABS tablet Take 1 tablet by mouth daily      metoclopramide (REGLAN) 5 MG tablet take 1 tablet by mouth twice a day      progesterone (PROMETRIUM) 200 MG CAPS capsule Take 1 capsule by mouth daily      PROBIOTIC PRODUCT PO Take by mouth daily      busPIRone (BUSPAR) 15 MG tablet Take 15 mg by mouth 2 times daily      thyroid (ARMOUR) 15 MG tablet Take 1 tablet by

## 2025-09-03 ENCOUNTER — PROCEDURE VISIT (OUTPATIENT)
Age: 60
End: 2025-09-03

## 2025-09-03 VITALS — SYSTOLIC BLOOD PRESSURE: 134 MMHG | OXYGEN SATURATION: 98 % | DIASTOLIC BLOOD PRESSURE: 88 MMHG

## 2025-09-03 DIAGNOSIS — L72.11 PILAR CYST OF SCALP: ICD-10-CM

## 2025-09-03 DIAGNOSIS — R20.8 LOCALIZED SKIN TENDERNESS: ICD-10-CM

## 2025-09-03 DIAGNOSIS — D22.9 IRRITATED NEVUS: Primary | ICD-10-CM

## (undated) DEVICE — GLOVE ORTHO 8   MSG9480

## (undated) DEVICE — BIOGUARD A/W CLEANING ADAPTER